# Patient Record
Sex: FEMALE | Race: WHITE | Employment: UNEMPLOYED | URBAN - METROPOLITAN AREA
[De-identification: names, ages, dates, MRNs, and addresses within clinical notes are randomized per-mention and may not be internally consistent; named-entity substitution may affect disease eponyms.]

---

## 2021-06-04 ENCOUNTER — APPOINTMENT (OUTPATIENT)
Dept: ULTRASOUND IMAGING | Age: 31
End: 2021-06-04
Payer: MEDICARE

## 2021-06-04 ENCOUNTER — HOSPITAL ENCOUNTER (EMERGENCY)
Age: 31
Discharge: ANOTHER HOSPITAL | End: 2021-06-04
Attending: EMERGENCY MEDICINE
Payer: MEDICARE

## 2021-06-04 VITALS
SYSTOLIC BLOOD PRESSURE: 124 MMHG | DIASTOLIC BLOOD PRESSURE: 82 MMHG | BODY MASS INDEX: 26.05 KG/M2 | HEART RATE: 74 BPM | HEIGHT: 63 IN | RESPIRATION RATE: 14 BRPM | WEIGHT: 147 LBS | OXYGEN SATURATION: 99 % | TEMPERATURE: 98 F

## 2021-06-04 DIAGNOSIS — R10.9 ABDOMINAL PAIN, UNSPECIFIED ABDOMINAL LOCATION: Primary | ICD-10-CM

## 2021-06-04 LAB
A/G RATIO: 1.1 (ref 1.1–2.2)
ABO/RH: NORMAL
ALBUMIN SERPL-MCNC: 3.4 G/DL (ref 3.4–5)
ALP BLD-CCNC: 64 U/L (ref 40–129)
ALT SERPL-CCNC: 9 U/L (ref 10–40)
ANION GAP SERPL CALCULATED.3IONS-SCNC: 9 MMOL/L (ref 3–16)
AST SERPL-CCNC: 14 U/L (ref 15–37)
BASOPHILS ABSOLUTE: 0 K/UL (ref 0–0.2)
BASOPHILS RELATIVE PERCENT: 0.5 %
BILIRUB SERPL-MCNC: <0.2 MG/DL (ref 0–1)
BUN BLDV-MCNC: 6 MG/DL (ref 7–20)
CALCIUM SERPL-MCNC: 8.2 MG/DL (ref 8.3–10.6)
CHLORIDE BLD-SCNC: 100 MMOL/L (ref 99–110)
CO2: 23 MMOL/L (ref 21–32)
CREAT SERPL-MCNC: <0.5 MG/DL (ref 0.6–1.1)
EOSINOPHILS ABSOLUTE: 0.1 K/UL (ref 0–0.6)
EOSINOPHILS RELATIVE PERCENT: 2.1 %
GFR AFRICAN AMERICAN: >60
GFR NON-AFRICAN AMERICAN: >60
GLOBULIN: 3.1 G/DL
GLUCOSE BLD-MCNC: 74 MG/DL (ref 70–99)
GONADOTROPIN, CHORIONIC (HCG) QUANT: NORMAL MIU/ML
HCT VFR BLD CALC: 28.6 % (ref 36–48)
HEMOGLOBIN: 9.6 G/DL (ref 12–16)
LYMPHOCYTES ABSOLUTE: 1.2 K/UL (ref 1–5.1)
LYMPHOCYTES RELATIVE PERCENT: 18.7 %
MAGNESIUM: 1.8 MG/DL (ref 1.8–2.4)
MCH RBC QN AUTO: 29.1 PG (ref 26–34)
MCHC RBC AUTO-ENTMCNC: 33.5 G/DL (ref 31–36)
MCV RBC AUTO: 86.8 FL (ref 80–100)
MONOCYTES ABSOLUTE: 0.4 K/UL (ref 0–1.3)
MONOCYTES RELATIVE PERCENT: 6.7 %
NEUTROPHILS ABSOLUTE: 4.5 K/UL (ref 1.7–7.7)
NEUTROPHILS RELATIVE PERCENT: 72 %
PDW BLD-RTO: 14.4 % (ref 12.4–15.4)
PLATELET # BLD: 227 K/UL (ref 135–450)
PMV BLD AUTO: 7.7 FL (ref 5–10.5)
POTASSIUM REFLEX MAGNESIUM: 3.4 MMOL/L (ref 3.5–5.1)
RBC # BLD: 3.29 M/UL (ref 4–5.2)
SODIUM BLD-SCNC: 132 MMOL/L (ref 136–145)
TOTAL PROTEIN: 6.5 G/DL (ref 6.4–8.2)
WBC # BLD: 6.3 K/UL (ref 4–11)

## 2021-06-04 PROCEDURE — 99284 EMERGENCY DEPT VISIT MOD MDM: CPT

## 2021-06-04 PROCEDURE — 86901 BLOOD TYPING SEROLOGIC RH(D): CPT

## 2021-06-04 PROCEDURE — 86900 BLOOD TYPING SEROLOGIC ABO: CPT

## 2021-06-04 PROCEDURE — 83735 ASSAY OF MAGNESIUM: CPT

## 2021-06-04 PROCEDURE — 76805 OB US >/= 14 WKS SNGL FETUS: CPT

## 2021-06-04 PROCEDURE — 80053 COMPREHEN METABOLIC PANEL: CPT

## 2021-06-04 PROCEDURE — 84702 CHORIONIC GONADOTROPIN TEST: CPT

## 2021-06-04 PROCEDURE — 85025 COMPLETE CBC W/AUTO DIFF WBC: CPT

## 2021-06-04 PROCEDURE — 76819 FETAL BIOPHYS PROFIL W/O NST: CPT

## 2021-06-04 ASSESSMENT — ENCOUNTER SYMPTOMS
ABDOMINAL PAIN: 1
RESPIRATORY NEGATIVE: 1

## 2021-06-04 ASSESSMENT — PAIN SCALES - GENERAL: PAINLEVEL_OUTOF10: 8

## 2021-06-04 NOTE — ED NOTES
Pt out of room at [employer], [facility]  06/04/21 1947

## 2021-06-04 NOTE — ED NOTES
Pt pregnant with 7th pregnancy pt sate that yesterday she was coming down the steps in the rain and slip on some leaves on the steps. Pt state that she fell down a total of 8 steps. On assessment pt with a bruise on left calf. Pt with c/o lower back pain, lower abd pain and upper abd pain. Pt fetal heart tones 154-156. Pt awaiting OB [employer] to come in. Pt instructed to have pants off. Pt follow OB at [facility].         Loel Koyanagi, RN  06/04/21 8160

## 2021-06-04 NOTE — ED PROVIDER NOTES
[address] ED  EMERGENCY DEPARTMENT ENCOUNTER        Pt Name: Niharika Leary  MRN: [id number]  [place] 1990  Date of evaluation: 2021  Provider: Robert Holder PA-C  PCP: No primary care provider on file. Note Started: 4:32 PM EDT        I have seen and evaluated this patient with my supervising physician Henry Sandoval MD.    CHIEF COMPLAINT       Chief Complaint   Patient presents with    Fall     pt fell down 8 steps in the rain yesterday and today right arm pain left leg bruise lower back and tail bone pain abd lower and upper abd pain       HISTORY OF PRESENT ILLNESS   (Location, Timing/Onset, Context/Setting, Quality, Duration, Modifying Factors, Severity, Associated Signs and Symptoms)  Note limiting factors. Niharika Leary is a 33 y.o. female  who presents with a Chief Complaint of abdominal pain after a fall. Patient reports she is approximately 20 weeks pregnant. Reports that she follows with  OB/GYN however she has missed her last few appointments. States that yesterday she fell down about 8 steps. Denied hitting her head or loss of consciousness. She is complaining today primarily of upper and lower abdominal pain. Denies vaginal bleeding. States she has had decreased fetal movement. Onset occurred yesterday. Duration symptoms have been persistent since onset. No aggravating complaints. No alleviating complaints. Context includes abdominal pain after a fall. Denies nausea or vomiting. Denies urinary complaints. Otherwise denies any other complaints. Nothing seems to make symptoms better or worse. Nursing Notes were all reviewed and agreed with or any disagreements were addressed in the HPI. REVIEW OF SYSTEMS    (2-9 systems for level 4, 10 or more for level 5)     Review of Systems   Constitutional: Negative. Respiratory: Negative. Cardiovascular: Negative. Gastrointestinal: Positive for abdominal pain. Genitourinary: Negative. Negative for vaginal bleeding. Musculoskeletal: Negative. Skin: Negative. Positives and Pertinent negatives as per HPI. Except as noted above in the ROS, all other systems were reviewed and negative. PAST MEDICAL HISTORY   History reviewed. No pertinent past medical history. SURGICAL HISTORY     Past Surgical History:   Procedure Laterality Date    JOINT REPLACEMENT           CURRENTMEDICATIONS       Previous Medications    PRENATAL VIT-FE FUMARATE-FA (PRENATAL VITAMIN PO)    Take by mouth         ALLERGIES     Asa [aspirin], Motrin [ibuprofen], and Tylenol [acetaminophen]    FAMILYHISTORY     History reviewed. No pertinent family history. SOCIAL HISTORY       Social History     Tobacco Use    Smoking status: Never Smoker    Smokeless tobacco: Never Used   Substance Use Topics    Alcohol use: Not Currently    Drug use: Not on file       SCREENINGS             PHYSICAL EXAM    (up to 7 for level 4, 8 or more for level 5)     ED Triage Vitals   BP Temp Temp src Pulse Resp SpO2 Height Weight   -- -- -- -- -- -- -- --       Physical Exam  Vitals and nursing note reviewed. Constitutional:       General: She is awake. She is not in acute distress. Appearance: Normal appearance. She is well-developed and normal weight. She is not ill-appearing, toxic-appearing or diaphoretic. HENT:      Head: Normocephalic and atraumatic. Nose: Nose normal.   Eyes:      General:         Right eye: No discharge. Left eye: No discharge. Cardiovascular:      Rate and Rhythm: Normal rate and regular rhythm. Heart sounds: Normal heart sounds. No murmur heard. No gallop. Pulmonary:      Effort: Pulmonary effort is normal. No respiratory distress. Breath sounds: Normal breath sounds. No wheezing or rales. Chest:      Chest wall: No tenderness. Abdominal:      General: Abdomen is flat. Bowel sounds are normal.      Palpations: Abdomen is soft. Tenderness:  There is generalized abdominal tenderness. There is no right CVA tenderness, left CVA tenderness, guarding or rebound. Negative signs include Foreman's sign and McBurney's sign. Musculoskeletal:         General: No deformity. Normal range of motion. Cervical back: Normal range of motion and neck supple. Skin:     General: Skin is warm and dry. Neurological:      Mental Status: She is alert and oriented to person, place, and time. Psychiatric:         Behavior: Behavior normal. Behavior is cooperative. DIAGNOSTIC RESULTS   LABS:    Labs Reviewed   CBC WITH AUTO DIFFERENTIAL - Abnormal; Notable for the following components:       Result Value    RBC 3.29 (*)     Hemoglobin 9.6 (*)     Hematocrit 28.6 (*)     All other components within normal limits    Narrative:     Performed at:  [place] [address],  [facility]   Phone [phone]   COMPREHENSIVE METABOLIC PANEL W/ REFLEX TO MG FOR LOW K - Abnormal; Notable for the following components:    Sodium 132 (*)     Potassium reflex Magnesium 3.4 (*)     BUN 6 (*)     CREATININE <0.5 (*)     Calcium 8.2 (*)     ALT 9 (*)     AST 14 (*)     All other components within normal limits    Narrative:     Performed at:  [place] [address],  [place], [facility]   Phone [phone]   HCG, QUANTITATIVE, PREGNANCY    Narrative:     Performed at:  [place] [address],  [address] [place] [employer]   Phone [phone]   MAGNESIUM    Narrative:     Performed at:  [facility]) [facility],  [place], [employer]   Phone [phone]   URINE RT REFLEX TO CULTURE   ABO/RH    Narrative:     Performed at:  [facility]) [place] [address],  [place], [facility]   Phone [phone]       All other labs were within normal range or not returned as of this dictation. EKG:  All EKG's are interpreted by the Emergency Department Physician in the absence of a cardiologist.  Please see their note for interpretation of EKG. RADIOLOGY:   Non-plain film images such as CT, Ultrasound and MRI are read by the radiologist. Plain radiographic images are visualized and preliminarily interpreted by the ED Provider with the below findings:        Interpretation per the Radiologist below, if available at the time of this note:    [place]  [address]   Final Result   A single live intrauterine pregnancy with estimated gestational age of 19   weeks 6 day by ultrasound. The estimated fetal weight is 622 g. Biophysical profile is 8/8. US FETAL BIOPHYSICAL PROFILE WO NON STRESS TESTING   Final Result   A single live intrauterine pregnancy with estimated gestational age of 19   weeks 6 day by ultrasound. The estimated fetal weight is 622 g. Biophysical profile is 8/8. No results found. PROCEDURES   Unless otherwise noted below, none     Procedures    CRITICAL CARE TIME   N/A    CONSULTS:  IP CONSULT TO HOSPITALIST      EMERGENCY DEPARTMENT COURSE and DIFFERENTIAL DIAGNOSIS/MDM:   Vitals:    Vitals:    06/04/21 1644   BP: (!) 111/57   Pulse: 84   Resp: 16   Temp: 98 °F (36.7 °C)   TempSrc: Oral   SpO2: 99%   Weight: 147 lb (66.7 kg)   Height: 5' 3\" (1.6 m)       Patient was given the following medications:  Medications - No data to display        Patient brought in today by private vehicle for complaints of abdominal pain after a fall yesterday. She is approximately 28 weeks pregnant. On exam she is alert oriented afebrile breathing on room air satting at 99%. Nontoxic. No acute respiratory distress. Old labs and records reviewed. Patient seen by myself as well as my attending Dr. Nancy Swann. CBC shows no acute leukocytosis. Hemoglobin of 9.6. Potassium of 3.4. Kidney function unremarkable. Liver enzymes unremarkable.   Fetal heart tones assessed here in the ED. Fetal heart tones of 154 bpm[place]  [place] [address]. Will consult patient's OB/GYN at this time. Ultrasound shows a single live intrauterine pregnancy with estimated gestational age of 20 weeks 6 days by ultrasound. Physical profile is 8 out of 8. My attending did a FAST exam at the bedside which was unremarkable. Please see my attendings note for the fast procedure. I did consult patient's OB/GYN at  who recommended transfer to OB/GYN triage for further evaluation. I spoke to Dr. Alta Jaquez at [facility]. Patient is stable at this time. FINAL IMPRESSION      1. Abdominal pain, unspecified abdominal location          DISPOSITION/PLAN   DISPOSITION        PATIENT REFERRED TO:  No follow-up provider specified.     DISCHARGE MEDICATIONS:  New Prescriptions    No medications on file       DISCONTINUED MEDICATIONS:  Discontinued Medications    No medications on file              (Please note that portions of this note were completed with a voice recognition program.  Efforts were made to edit the dictations but occasionally words are mis-transcribed.)    Cruz Gage PA-C (electronically signed)            Cruz Gage PA-C  06/04/21 Marci Clay PA-C  06/04/21 8835

## 2021-06-04 NOTE — ED NOTES

## 2021-06-04 NOTE — ED NOTES
Call placed to [facility] for Ob/gyn consult at [address]  06/04/21 8368     ob/gyn returned the call @ [address]  06/04/21 9609

## 2021-06-08 NOTE — ED PROVIDER NOTES
I independently examined and evaluated Angie Blackwell. In brief, patient is a 78-year-old [address] [place] [address] female presents to the emergency department for evaluation after a fall. Patient reports she is approximately 20 weeks pregnant currently. She fell down several steps last night but says she did not have a ride until today which prompted the visit to the emergency department. Denies vaginal bleeding. Reports having pain to her buttocks where she fell and to the abdomen. Focused exam revealed generalized abdominal tenderness to all 4 quadrants. Hemoglobin was 9.6. Per chart review, patient's last hemoglobin was 11.4 on March 29, 2021. Although this may be 2/2 pregnancy related changes, I did do a quick bedside ultrasound which showed no obvious free fluid. Blood type is a positive and no vaginal bleeding and therefore RhoGam not indicated. Creatinine less than 0.5. Liver enzymes within normal limits. Formal ultrasound also obtained which showed a single live intrauterine pregnancy with estimated gestational age of 20 weeks and 6 days by ultrasound. Patient's OB was consulted and patient transferred to [facility] triage for further evaluation and treatment. All diagnostic, treatment, and disposition decisions were made by myself in conjunction with the advanced practice provider. For all further details of the patient's emergency department visit, please see the advanced practice provider's documentation. Comment: Please note this report has been produced using speech recognition software and may contain errors related to that system including errors in grammar, punctuation, and spelling, as well as words and phrases that may be inappropriate. If there are any questions or concerns please feel free to contact the dictating provider for clarification.        Mikal Rock MD  06/07/21 8472

## 2022-02-28 ENCOUNTER — APPOINTMENT (OUTPATIENT)
Dept: ULTRASOUND IMAGING | Age: 32
End: 2022-02-28
Payer: MEDICARE

## 2022-02-28 ENCOUNTER — HOSPITAL ENCOUNTER (EMERGENCY)
Age: 32
Discharge: HOME | End: 2022-02-28
Payer: MEDICARE

## 2022-02-28 VITALS
DIASTOLIC BLOOD PRESSURE: 65 MMHG | TEMPERATURE: 97.1 F | HEART RATE: 72 BPM | RESPIRATION RATE: 18 BRPM | OXYGEN SATURATION: 100 % | SYSTOLIC BLOOD PRESSURE: 110 MMHG

## 2022-02-28 DIAGNOSIS — O20.0 THREATENED MISCARRIAGE: Primary | ICD-10-CM

## 2022-02-28 LAB
A/G RATIO: 1.6 (ref 1.1–2.2)
ABO/RH: NORMAL
ALBUMIN SERPL-MCNC: 4.5 G/DL (ref 3.4–5)
ALP BLD-CCNC: 61 U/L (ref 40–129)
ALT SERPL-CCNC: 9 U/L (ref 10–40)
ANION GAP SERPL CALCULATED.3IONS-SCNC: 10 MMOL/L (ref 3–16)
AST SERPL-CCNC: 11 U/L (ref 15–37)
BACTERIA: ABNORMAL /HPF
BASOPHILS ABSOLUTE: 0 K/UL (ref 0–0.2)
BASOPHILS RELATIVE PERCENT: 0.7 %
BILIRUB SERPL-MCNC: 0.4 MG/DL (ref 0–1)
BILIRUBIN URINE: NEGATIVE
BLOOD, URINE: ABNORMAL
BUN BLDV-MCNC: 9 MG/DL (ref 7–20)
CALCIUM SERPL-MCNC: 9.6 MG/DL (ref 8.3–10.6)
CHLORIDE BLD-SCNC: 104 MMOL/L (ref 99–110)
CLARITY: ABNORMAL
CO2: 26 MMOL/L (ref 21–32)
COLOR: YELLOW
CREAT SERPL-MCNC: 0.7 MG/DL (ref 0.6–1.1)
EOSINOPHILS ABSOLUTE: 0.1 K/UL (ref 0–0.6)
EOSINOPHILS RELATIVE PERCENT: 2.6 %
EPITHELIAL CELLS, UA: ABNORMAL /HPF (ref 0–5)
GFR AFRICAN AMERICAN: >60
GFR NON-AFRICAN AMERICAN: >60
GLUCOSE BLD-MCNC: 91 MG/DL (ref 70–99)
GLUCOSE URINE: NEGATIVE MG/DL
GONADOTROPIN, CHORIONIC (HCG) QUANT: 776.7 MIU/ML
HCT VFR BLD CALC: 35.5 % (ref 36–48)
HEMOGLOBIN: 12.1 G/DL (ref 12–16)
KETONES, URINE: ABNORMAL MG/DL
LEUKOCYTE ESTERASE, URINE: NEGATIVE
LYMPHOCYTES ABSOLUTE: 1.1 K/UL (ref 1–5.1)
LYMPHOCYTES RELATIVE PERCENT: 24.6 %
MCH RBC QN AUTO: 29.9 PG (ref 26–34)
MCHC RBC AUTO-ENTMCNC: 34 G/DL (ref 31–36)
MCV RBC AUTO: 88.2 FL (ref 80–100)
MICROSCOPIC EXAMINATION: YES
MONOCYTES ABSOLUTE: 0.3 K/UL (ref 0–1.3)
MONOCYTES RELATIVE PERCENT: 7.2 %
MUCUS: ABNORMAL /LPF
NEUTROPHILS ABSOLUTE: 3 K/UL (ref 1.7–7.7)
NEUTROPHILS RELATIVE PERCENT: 64.9 %
NITRITE, URINE: NEGATIVE
PDW BLD-RTO: 14.5 % (ref 12.4–15.4)
PH UA: 6.5 (ref 5–8)
PLATELET # BLD: 251 K/UL (ref 135–450)
PMV BLD AUTO: 8.2 FL (ref 5–10.5)
POTASSIUM REFLEX MAGNESIUM: 4.2 MMOL/L (ref 3.5–5.1)
PROTEIN UA: NEGATIVE MG/DL
RBC # BLD: 4.02 M/UL (ref 4–5.2)
RBC UA: ABNORMAL /HPF (ref 0–4)
SODIUM BLD-SCNC: 140 MMOL/L (ref 136–145)
SPECIFIC GRAVITY UA: >=1.03 (ref 1–1.03)
TOTAL PROTEIN: 7.3 G/DL (ref 6.4–8.2)
URINE TYPE: ABNORMAL
UROBILINOGEN, URINE: 0.2 E.U./DL
WBC # BLD: 4.7 K/UL (ref 4–11)
WBC UA: ABNORMAL /HPF (ref 0–5)

## 2022-02-28 PROCEDURE — 86900 BLOOD TYPING SEROLOGIC ABO: CPT

## 2022-02-28 PROCEDURE — 85025 COMPLETE CBC W/AUTO DIFF WBC: CPT

## 2022-02-28 PROCEDURE — 76801 OB US < 14 WKS SINGLE FETUS: CPT

## 2022-02-28 PROCEDURE — 99285 EMERGENCY DEPT VISIT HI MDM: CPT

## 2022-02-28 PROCEDURE — 81001 URINALYSIS AUTO W/SCOPE: CPT

## 2022-02-28 PROCEDURE — 76817 TRANSVAGINAL US OBSTETRIC: CPT

## 2022-02-28 PROCEDURE — 84702 CHORIONIC GONADOTROPIN TEST: CPT

## 2022-02-28 PROCEDURE — 80053 COMPREHEN METABOLIC PANEL: CPT

## 2022-02-28 PROCEDURE — 88305 TISSUE EXAM BY PATHOLOGIST: CPT

## 2022-02-28 PROCEDURE — 86901 BLOOD TYPING SEROLOGIC RH(D): CPT

## 2022-02-28 RX ORDER — 0.9 % SODIUM CHLORIDE 0.9 %
1000 INTRAVENOUS SOLUTION INTRAVENOUS ONCE
Status: DISCONTINUED | OUTPATIENT
Start: 2022-02-28 | End: 2022-02-28 | Stop reason: HOSPADM

## 2022-02-28 RX ADMIN — Medication 1000 ML: at 13:45

## 2022-02-28 ASSESSMENT — ENCOUNTER SYMPTOMS
RHINORRHEA: 0
SHORTNESS OF BREATH: 0
EYE PAIN: 0
COUGH: 0
SORE THROAT: 0
NAUSEA: 0
VOMITING: 0
BACK PAIN: 0
DIARRHEA: 0
BLOOD IN STOOL: 0
ABDOMINAL PAIN: 0

## 2022-02-28 NOTE — ED NOTES
Pt to [address] [facility] [address] via stretcher. Cheryle Mill, RN Cheryle Mill, RN  02/28/22 1127

## 2022-02-28 NOTE — ED NOTES
Pt. Back from [facility] at this time. Pelvic cart set up at bedside. Provider aware.      Katlyn Koroma RN  02/28/22 6279

## 2022-02-28 NOTE — ED NOTES
[address] [phone] - Called  transfer center for clinic #.   [address] for attending      Ruma Patron  02/28/22 [phone] - Dr. Simeon De Jesus with [address] [employer] [address] called back.       Ruma Patron  02/28/22 3811

## 2022-02-28 NOTE — ED PROVIDER NOTES
[address] ED  EMERGENCY DEPARTMENT ENCOUNTER        Pt Name: Nasreen Rodriguez  MRN: [id number]  [place] 1990  Date of evaluation: 2022  Provider: PRAKASH Hernandez CNP  PCP: No primary care provider on file. Note Started: 2:10 PM EST      EMILIANA. I have evaluated this patient. My supervising physician was available for consultation. Triage CHIEF COMPLAINT       Chief Complaint   Patient presents with    Miscarriage     patient states she is having pain and cramping. Patient passed a mass of tissues and was told to bag it and bring it to ER. HISTORY OF PRESENT ILLNESS   (Location/Symptom, Timing/Onset, Context/Setting, Quality, Duration, Modifying Factors, Severity)  Note limiting factors. Chief Complaint: Vaginal Bleeding    Nasreen Rodriguez is a 33 y.o. female who presents to the emergency department with symptoms of vaginal bleeding. Patient is a G8 T0  L6. Patient states that she last had a menstrual period on 21. Patient states that she most recently had a spontaneous vaginal delivery 6 months ago. States that she had missed her menstrual cycle in January and had a low suspicion that she was pregnant. Patient states that she has had intermittent episodes of abdominal discomfort and flank discomfort but denied any severe abdominal pain. Reported to me that the last 2 days she has noted some vaginal bleeding. States that early morning/last night she noted that there was some small amount of tissue that came with the bleeding from her vagina. Patient states that she concerned she may have had a miscarriage. It appears that the tissue was in a saclike substance and she is concerned that this could be proximal of conception. She states that since then the bleeding has improved. She denies any symptoms of fevers or chills. No vomiting. States that pain is minimal at this time.   Patient states that she has not taken a positive pregnancy test nor has she been to see her OB/GYN for this complaint. Nursing Notes were all reviewed and agreed with or any disagreements were addressed in the HPI. REVIEW OF SYSTEMS    (2-9 systems for level 4, 10 or more for level 5)     Review of Systems   Constitutional: Negative for chills, diaphoresis and fever. HENT: Negative for congestion, ear pain, rhinorrhea and sore throat. Eyes: Negative for pain and visual disturbance. Respiratory: Negative for cough and shortness of breath. Cardiovascular: Negative for chest pain and leg swelling. Gastrointestinal: Negative for abdominal pain, blood in stool, diarrhea, nausea and vomiting. Genitourinary: Positive for vaginal bleeding. Negative for difficulty urinating, dysuria, flank pain and frequency. Musculoskeletal: Negative for back pain and neck pain. Skin: Negative for rash and wound. Neurological: Negative for dizziness and light-headedness. PAST MEDICAL HISTORY   No past medical history on file. SURGICAL HISTORY     Past Surgical History:   Procedure Laterality Date    JOINT REPLACEMENT         CURRENTMEDICATIONS       Previous Medications    PRENATAL VIT-FE FUMARATE-FA (PRENATAL VITAMIN PO)    Take by mouth       ALLERGIES     Asa [aspirin], Motrin [ibuprofen], and Tylenol [acetaminophen]    FAMILYHISTORY     No family history on file.      SOCIAL HISTORY       Social History     Socioeconomic History    Marital status: Single     Spouse name: Not on file    Number of children: Not on file    Years of education: Not on file    Highest education level: Not on file   Occupational History    Not on file   Tobacco Use    Smoking status: Never Smoker    Smokeless tobacco: Never Used   Substance and Sexual Activity    Alcohol use: Not Currently    Drug use: Not on file    Sexual activity: Not on file   Other Topics Concern    Not on file   Social History Narrative    Not on file     Social Determinants of Health     Financial Resource Strain:     Difficulty of Paying Living Expenses: Not on file   Food Insecurity:     Worried About Running Out of Food in the Last Year: Not on file    Justin of Food in the Last Year: Not on file   Transportation Needs:     Lack of Transportation (Medical): Not on file    Lack of Transportation (Non-Medical): Not on file   Physical Activity:     Days of Exercise per Week: Not on file    Minutes of Exercise per Session: Not on file   Stress:     Feeling of Stress : Not on file   Social Connections:     Frequency of Communication with Friends and Family: Not on file    Frequency of Social Gatherings with Friends and Family: Not on file    Attends Anabaptist Services: Not on file    Active Member of [address] [place] [address] [employer] or Organizations: Not on file    Attends Club or Organization Meetings: Not on file    Marital Status: Not on file   Intimate Partner Violence:     Fear of Current or Ex-Partner: Not on file    Emotionally Abused: Not on file    Physically Abused: Not on file    Sexually Abused: Not on file   Housing Stability:     Unable to Pay for Housing in the Last Year: Not on file    Number of [place] in the Last Year: Not on file    Unstable Housing in the Last Year: Not on file       SCREENINGS    Ferdinand Coma Scale  Eye Opening: Spontaneous  Best Verbal Response: Oriented  Best Motor Response: Obeys commands  Ferdinand Coma Scale Score: 15        PHYSICAL EXAM    (up to 7 for level 4, 8 or more for level 5)     ED Triage Vitals [02/28/22 1028]   BP Temp Temp Source Pulse Resp SpO2 Height Weight   121/78 97.1 °F (36.2 °C) Temporal 73 18 99 % -- --       Physical Exam  Vitals and nursing note reviewed. Exam conducted with a chaperone present (RN at bedside). Constitutional:       Appearance: Normal appearance. She is not toxic-appearing or diaphoretic. HENT:      Head: Normocephalic and atraumatic. Nose: Nose normal.   Eyes:      General:         Right eye: No discharge.          Left eye: No discharge. Cardiovascular:      Rate and Rhythm: Normal rate and regular rhythm. Pulses: Normal pulses. Heart sounds: No murmur heard. Pulmonary:      Effort: Pulmonary effort is normal. No respiratory distress. Abdominal:      Palpations: Abdomen is soft. Tenderness: There is no abdominal tenderness. There is no guarding or rebound. Genitourinary:     Vagina: Bleeding present. Cervix: Cervical bleeding present. Uterus: Not deviated, not enlarged and not tender. Adnexa: Right adnexa normal and left adnexa normal.      Comments: Cervical os is open. Is noted that the patient does have some very mild bleeding from the cervical os. The vaginal vault did have some blood but once cleared with a cotton tip swab no bleeding or blood in the vaginal vault. Musculoskeletal:         General: Normal range of motion. Cervical back: Normal range of motion and neck supple. Skin:     General: Skin is warm and dry. Neurological:      General: No focal deficit present. Mental Status: She is alert and oriented to person, place, and time.    Psychiatric:         Mood and Affect: Mood normal.         Behavior: Behavior normal.         DIAGNOSTIC RESULTS   LABS:    Labs Reviewed   CBC WITH AUTO DIFFERENTIAL - Abnormal; Notable for the following components:       Result Value    Hematocrit 35.5 (*)     All other components within normal limits    Narrative:     Performed at:  [facility] [address],  [place], [facility]   Phone [phone]   COMPREHENSIVE METABOLIC PANEL W/ REFLEX TO MG FOR LOW K - Abnormal; Notable for the following components:    ALT 9 (*)     AST 11 (*)     All other components within normal limits    Narrative:     Performed at:  [facility]) - [facility] [address],  [place], [facility]   Phone [phone]   URINALYSIS WITH MICROSCOPIC - Abnormal; Notable for the following components: Clarity, UA SL CLOUDY (*)     Ketones, Urine TRACE (*)     Blood, Urine LARGE (*)     Mucus, UA 3+ (*)     RBC, UA 21-50 (*)     Epithelial Cells, UA 6-10 (*)     Bacteria, UA 1+ (*)     All other components within normal limits    Narrative:     Performed at:  [facility] [address],  [place], [facility]   Phone [phone]   HCG, QUANTITATIVE, PREGNANCY    Narrative:     Performed at:  [facility] [address],  [place], [facility]   Phone [phone]   SURGICAL PATHOLOGY   SURGICAL PATHOLOGY   ABO/RH    Narrative:     Performed at:  [facility]) [facility] [address],  [place], [facility]   Phone [phone]       When ordered, only abnormal lab results are displayed. All other labs were within normal range or not returned as of this dictation. EKG: When ordered, EKG's are interpreted by the Emergency Department Physician in the absence of a cardiologist.  Please see their note for interpretation of EKG. RADIOLOGY:   Non-plain film images such as CT, Ultrasound and MRI are read by the radiologist. Plain radiographic images are visualized andpreliminarily interpreted by the  ED Provider with the below findings:        Interpretation perthe Radiologist below, if available at the time of this note:    US OB TRANSVAGINAL   Final Result   1. No current sonographic evidence of a viable intrauterine pregnancy or   ectopic pregnancy. There is a mildly thickened and heterogeneous appearance   of the endometrial stripe, which may suggest mild retained products of   conception. 2. Small 1.6 cm mildly complicated cyst within the right ovary, most likely a   mildly complicated corpus luteal cyst.  The bilateral ovaries are otherwise   unremarkable, without evidence of torsion, mass, or ectopic pregnancy. US OB LESS THAN 14 WEEKS SINGLE OR FIRST GESTATION   Final Result   1.  No current sonographic evidence of a viable intrauterine pregnancy or   ectopic pregnancy. There is a mildly thickened and heterogeneous appearance   of the endometrial stripe, which may suggest mild retained products of   conception. 2. Small 1.6 cm mildly complicated cyst within the right ovary, most likely a   mildly complicated corpus luteal cyst.  The bilateral ovaries are otherwise   unremarkable, without evidence of torsion, mass, or ectopic pregnancy. US OB LESS THAN 14 WEEKS SINGLE OR FIRST GESTATION    Result Date: 2/28/2022  EXAMINATION: FIRST TRIMESTER OBSTETRIC ULTRASOUND 2/28/2022 TECHNIQUE: Transvaginal first trimester obstetric pelvic ultrasound was performed with color Doppler flow evaluation.; Transabdominal and transvaginal first trimester obstetric pelvic ultrasound was performed with color Doppler flow evaluation. COMPARISON: None HISTORY: ORDERING SYSTEM PROVIDED HISTORY: Pregnant and Pelvic Pain TECHNOLOGIST PROVIDED HISTORY: Reason for exam:->Pregnant and Pelvic Pain; ORDERING SYSTEM PROVIDED HISTORY: pregnant, bleeding and pain TECHNOLOGIST PROVIDED HISTORY: Reason for exam:->pregnant, bleeding and pain Patient states she passed in intact gestational sac earlier this morning FINDINGS: Uterus: Measures 9.0 x 6.7 x 4.9 cm Gestational Sac(s):  A gestational sac is not identified. The endometrial stripe is mildly thickened and heterogeneous in echotexture, measuring approximately 17.9 mm in thickness. This could represent mild retained products of conception. Yolk Sac:  Not identified Fetal Pole:  Not identified Crown Rump Length:  Not applicable Fetal Heart Rate:  Not applicable Right ovary: Measures 2.6 x 1.9 x 1.8 cm Left ovary: Measures 2.2 x 0.7 x 0.8 cm The bilateral ovaries demonstrate normal arterial and venous Doppler flow, without evidence of an abnormal mass, torsion, or ectopic pregnancy.   There is a 1.6 x 1.4 x 1.1 cm mildly complicated cyst within the right ovary, likely a mildly complicated corpus luteal cyst. Free fluid: None     1. No current sonographic evidence of a viable intrauterine pregnancy or ectopic pregnancy. There is a mildly thickened and heterogeneous appearance of the endometrial stripe, which may suggest mild retained products of conception. 2. Small 1.6 cm mildly complicated cyst within the right ovary, most likely a mildly complicated corpus luteal cyst.  The bilateral ovaries are otherwise unremarkable, without evidence of torsion, mass, or ectopic pregnancy. US OB TRANSVAGINAL    Result Date: 2/28/2022  EXAMINATION: FIRST TRIMESTER OBSTETRIC ULTRASOUND 2/28/2022 TECHNIQUE: Transvaginal first trimester obstetric pelvic ultrasound was performed with color Doppler flow evaluation.; Transabdominal and transvaginal first trimester obstetric pelvic ultrasound was performed with color Doppler flow evaluation. COMPARISON: None HISTORY: ORDERING SYSTEM PROVIDED HISTORY: Pregnant and Pelvic Pain TECHNOLOGIST PROVIDED HISTORY: Reason for exam:->Pregnant and Pelvic Pain; ORDERING SYSTEM PROVIDED HISTORY: pregnant, bleeding and pain TECHNOLOGIST PROVIDED HISTORY: Reason for exam:->pregnant, bleeding and pain Patient states she passed in intact gestational sac earlier this morning FINDINGS: Uterus: Measures 9.0 x 6.7 x 4.9 cm Gestational Sac(s):  A gestational sac is not identified. The endometrial stripe is mildly thickened and heterogeneous in echotexture, measuring approximately 17.9 mm in thickness. This could represent mild retained products of conception. Yolk Sac:  Not identified Fetal Pole:  Not identified Crown Rump Length:  Not applicable Fetal Heart Rate:  Not applicable Right ovary: Measures 2.6 x 1.9 x 1.8 cm Left ovary: Measures 2.2 x 0.7 x 0.8 cm The bilateral ovaries demonstrate normal arterial and venous Doppler flow, without evidence of an abnormal mass, torsion, or ectopic pregnancy.   There is a 1.6 x 1.4 x 1.1 cm mildly complicated cyst within the right ovary, likely a mildly complicated corpus luteal cyst. Free fluid: None     1. No current sonographic evidence of a viable intrauterine pregnancy or ectopic pregnancy. There is a mildly thickened and heterogeneous appearance of the endometrial stripe, which may suggest mild retained products of conception. 2. Small 1.6 cm mildly complicated cyst within the right ovary, most likely a mildly complicated corpus luteal cyst.  The bilateral ovaries are otherwise unremarkable, without evidence of torsion, mass, or ectopic pregnancy. PROCEDURES   Unless otherwise noted below, none     Procedures    CRITICAL CARE TIME   N/A    CONSULTS:  IP CONSULT TO OB GYN      EMERGENCY DEPARTMENT COURSE and DIFFERENTIAL DIAGNOSIS/MDM:   Vitals:    Vitals:    02/28/22 1106 02/28/22 1228 02/28/22 1332 02/28/22 1348   BP:  (!) 103/52 (!) 92/53 110/70   Pulse: 68 63     Resp:  18     Temp:       TempSrc:       SpO2:  100% 100% 100%       Patient was given thefollowing medications:  Medications   0.9 % sodium chloride bolus (1,000 mLs IntraVENous Bolus from Bag 2/28/22 1345)           With Dr. Hector Stephens who is an OB/GYN at [facility] DR AMIRA REBOLLEDO.  He advised the patient can follow-up in 1 week. Patient is to call [phone]. The patient displays no other acute complaints at this time. Her blood pressure at a low reading but right after was retaken and had a normal reading of 110/70. This time I have low suspicion for acute blood loss is causing her to be hypotensive or unstable. She is not tachycardic. She appears well. Bleeding is very minimal on exam at this time. The patient displays no other acute complaints at this time and I believe can be discharged home to follow-up as an outpatient. FINAL IMPRESSION      1. Threatened miscarriage          DISPOSITION/PLAN   DISPOSITION Decision To Discharge 02/28/2022 02:11:59 PM      PATIENT REFERREDTO:  Dr. Hector Stephens  Follow-up with the [facility] in 1 week.   You are to call [phone].           DISCHARGE MEDICATIONS:  New Prescriptions    No medications on file       DISCONTINUED MEDICATIONS:  Discontinued Medications    No medications on file              (Please note that portions ofthis note were completed with a voice recognition program.  Efforts were made to edit the dictations but occasionally words are mis-transcribed.)    PRAKASH Pressley CNP (electronically signed)            PRAKASH Pressley CNP  02/28/22 4540